# Patient Record
Sex: MALE | Race: BLACK OR AFRICAN AMERICAN | NOT HISPANIC OR LATINO
[De-identification: names, ages, dates, MRNs, and addresses within clinical notes are randomized per-mention and may not be internally consistent; named-entity substitution may affect disease eponyms.]

---

## 2023-12-08 PROBLEM — Z00.00 ENCOUNTER FOR PREVENTIVE HEALTH EXAMINATION: Status: ACTIVE | Noted: 2023-12-08

## 2023-12-11 ENCOUNTER — APPOINTMENT (OUTPATIENT)
Dept: ORTHOPEDIC SURGERY | Facility: CLINIC | Age: 48
End: 2023-12-11
Payer: COMMERCIAL

## 2023-12-11 VITALS — BODY MASS INDEX: 28.93 KG/M2 | WEIGHT: 180 LBS | HEIGHT: 66 IN

## 2023-12-11 DIAGNOSIS — Z78.9 OTHER SPECIFIED HEALTH STATUS: ICD-10-CM

## 2023-12-11 PROCEDURE — 73564 X-RAY EXAM KNEE 4 OR MORE: CPT | Mod: LT

## 2023-12-11 PROCEDURE — 99203 OFFICE O/P NEW LOW 30 MIN: CPT

## 2023-12-13 ENCOUNTER — APPOINTMENT (OUTPATIENT)
Dept: MRI IMAGING | Facility: CLINIC | Age: 48
End: 2023-12-13
Payer: COMMERCIAL

## 2023-12-13 PROCEDURE — 73721 MRI JNT OF LWR EXTRE W/O DYE: CPT | Mod: LT

## 2023-12-19 ENCOUNTER — APPOINTMENT (OUTPATIENT)
Dept: ORTHOPEDIC SURGERY | Facility: CLINIC | Age: 48
End: 2023-12-19

## 2023-12-26 ENCOUNTER — APPOINTMENT (OUTPATIENT)
Dept: ORTHOPEDIC SURGERY | Facility: CLINIC | Age: 48
End: 2023-12-26
Payer: COMMERCIAL

## 2023-12-26 PROCEDURE — 99213 OFFICE O/P EST LOW 20 MIN: CPT

## 2023-12-26 NOTE — HISTORY OF PRESENT ILLNESS
[4] : 4 [0] : 0 [Dull/Aching] : dull/aching [Sharp] : sharp [Shooting] : shooting [Stabbing] : stabbing [Occasional] : occasional [Ice] : ice [Standing] : standing [Walking] : walking [Stairs] : stairs [de-identified] : 12/26/2023: Patient is here today to discuss MRI results of his left knee. Reports symptoms unchanged since last visit. Knee keeps locking up. MRI showing complex medial meniscal tear, cartilage wear, subchondral edema.   12/11/23: New patient is a 48-year-old male presenting for evaluation of left knee pain that has been ongoing for 5 months after an injury while running.  States he was running and felt a sudden buckle of the left knee which was followed by swelling that lasted for several days and decreased range of motion.  Over the last few months, he has had improvement with the swelling and range of motion with home exercises and stretching.  He states the knee currently feels stronger.  However, he continues to have locking sensations when changing positions and pain with lateral movements upon exercise.  He has been icing intermittently.  He has been using a compression sleeve with exercise.  He has not needed to use any anti-inflammatories recently.  He denies any further swelling.  He denies any weakness or paresthesias [] : no [FreeTextEntry1] : left knee

## 2023-12-26 NOTE — ASSESSMENT
[FreeTextEntry1] : Ongoing left knee pain for 5 months after a buckling event while running.  Symptoms consistent with medial meniscal tear causing locking of the knee and pain with daily activities.  X-rays obtained showing chondrocalcinosis of the meniscus small degenerative changes.  MRI confirmed. Has completed several months now of HEP and conservative treatment.  - Referral to [facility] to discuss scope given mechanical symptoms. Discussed CSI as well, defers at this time - Recommended use of compression sleeve, ice, Aleve when symptoms are exacerbated - Continue home exercise program as tolerated.  Discussed use of exercise bike, elliptical, rower, swimming instead of running if this is causing him pain

## 2023-12-26 NOTE — PHYSICAL EXAM
[de-identified] : Constitutional: Well developed, well nourished, able to communicate Neuro: Normal sensation, No focal deficits Skin: Intact CV: Peripheral vascular exam grossly normal Pulm: No signs of respiratory distress Psych: Oriented, normal mood and affect

## 2023-12-26 NOTE — IMAGING
[Left] : left knee [There are no fractures, subluxations or dislocations. No significant abnormalities are seen] : There are no fractures, subluxations or dislocations. No significant abnormalities are seen [Degenerative change] : Degenerative change [Mild tricompartmental OA medial narrowing] : Mild tricompartmental OA medial narrowing [de-identified] : L knee: - No obvious deformity or swelling -Mild pain with palpation over posteromedial joint line. No pain lateral joint line. - ROM from 135 degrees of flexion to 0 degrees extension. - 5/5 strength with knee flexion and extension - Stable varus, valgus, Lachman's, posterior drawer testing - Negative Melisa's - Crepitus with patellar grind - Distally neurovascularly intact.    R knee: - No obvious deformity or swelling - No pain with palpation of medial or lateral joint line. - ROM from 135 degrees of flexion to 0 degrees extension. - 5/5 strength with knee flexion and extension - Stable varus, valgus, Lachman's, posterior drawer testing - Distally neurovascularly intact.    [FreeTextEntry9] : chondrocalcinosis of the meniscus

## 2024-01-17 ENCOUNTER — APPOINTMENT (OUTPATIENT)
Dept: ORTHOPEDIC SURGERY | Facility: CLINIC | Age: 49
End: 2024-01-17
Payer: COMMERCIAL

## 2024-01-17 DIAGNOSIS — S83.242A OTHER TEAR OF MEDIAL MENISCUS, CURRENT INJURY, LEFT KNEE, INITIAL ENCOUNTER: ICD-10-CM

## 2024-01-17 DIAGNOSIS — M23.92 UNSPECIFIED INTERNAL DERANGEMENT OF LEFT KNEE: ICD-10-CM

## 2024-01-17 PROCEDURE — 99214 OFFICE O/P EST MOD 30 MIN: CPT

## 2024-01-17 NOTE — DISCUSSION/SUMMARY
[de-identified] : Plan: Patient understands that He is a candidate for LEFT KNEE ARTHROSCOPY WITH PARTIAL MENISCECTOMY. Discussed procedure. Discussed non-operative and operative treatment options. All questions and concerns regarding the surgery were addressed. Went over the recovery timeline and expected outcomes following surgery. Patient elected to move forward with the surgical procedure.  Tests Ordered: Post-op and COVID  Prescription Medications Ordered: ~  ~  Braces/DME Ordered: ~  ~  Activity/Work/Sports Status: ~  ~  Additional Instructions: ~  ~  Follow-Up: 2 weeks post-op   The patient's current medication management of their orthopedic diagnosis was reviewed today: (1) We discussed a comprehensive treatment plan that included possible pharmaceutical management involving the use of prescription strength medications including but not limited to options such as oral Naprosyn 500mg BID, once daily Meloxicam 15 mg, or 500-650 mg Tylenol versus over the counter oral medications and topical prescription NSAID Pennsaid vs over the counter Voltaren gel.  (2) There is a moderate risk of morbidity with further treatment, especially from use of prescription strength medications and possible side effects of these medications which include upset stomach with oral medications, skin reactions to topical medications and cardiac/renal issues with long term use.  (3) I recommended that the patient follow-up with their medical physician to discuss any significant specific potential issues with long term medication use such as interactions with current medications or with exacerbation of underlying medical comorbidities.  (4) The benefits and risks associated with use of injectable, oral or topical, prescription and over the counter anti-inflammatory medications were discussed with the patient. The patient voiced understanding of the risks including but not limited to bleeding, stroke, kidney dysfunction, heart disease, and were referred to the black box warning label for further information.

## 2024-01-17 NOTE — IMAGING
[de-identified] : The patient is a well appearing 48 year old male of their stated age. Negative straight leg raise bilateral   LEFT Knee:                   ROM:  0-130 degrees   Lachman: Negative Pivot Shift: Negative Anterior Drawer: Negative Posterior Drawer / Sag: Negative Varus Stress 0 degrees: Stable Varus Stress 30 degrees: Stable Valgus Stress 0 degrees: Stable Valgus Stress 30 degrees: Stable Medial Melisa: Positive Lateral Melisa: Negative Patella Glide: 2+ Patella Apprehension: Negative Patella Grind: Negative   Palpation: Medial Joint Line: TTP Lateral Joint Line: Nontender Medial Collateral Ligament: Nontender Lateral Collateral Ligament/PLC: Nontender Distal Femur: Nontender Proximal Tibia: Nontender Tibial Tubercle: Nontender Distal Pole Patella: Nontender Quadriceps Tendon: Nontender &  Intact Patella Tendon: Nontender &  Intact Medial Distal Hamstring/PES: Nontender Lateral Distal Hamstring: Nontender & Stable Iliotibial Band: Nontender Medial Patellofemoral Ligament: Nontender Adductor: Nontender Proximal GSC-Plantaris: Nontender Calf: Supple & Nontender   Inspection: Deformity: No Erythema: No Ecchymosis: No Abrasions: No Effusion: Moderate Prepatellar Bursitis: No   Neurologic Exam: Sensation L4-S1: Grossly Intact   Motor Exam: Quadriceps: 5 out of 5 Hamstrings: 5 out of 5 EHL: 5 out of 5 FHL: 5 out of 5 TA: 5 out of 5 GS: 5 out of 5   Circulatory/Pulses: Dorsalis Pedis: 2+ Posterior Tibialis: 2+   Additional Pertinent Findings: None   Contralateral Knee:              ROM: 0-130 degrees   Other Pertinent Findings: None     X-RAYS of the LEFT knee (4 views, including AP, Lateral, Merchant, and Tunnel): no fracture or dislocation

## 2024-01-17 NOTE — HISTORY OF PRESENT ILLNESS
[] : yes [de-identified] : 01/17/2024: YAN PAPPAS is a 48 year old male complaining of left knee pain x 6 months after knee buckled while running. c/o locking and catching. pain at medial aspect of knee. saw [facility] 12/11/23 +xrays and MRI  [FreeTextEntry1] : left knee  [de-identified] : 12/26/23 [de-identified] : [facility] [de-identified] : xrays and MRI

## 2024-02-28 RX ORDER — ACETAMINOPHEN 500 MG/1
500 TABLET ORAL 3 TIMES DAILY
Qty: 60 | Refills: 0 | Status: ACTIVE | COMMUNITY
Start: 2024-02-28 | End: 1900-01-01

## 2024-02-28 RX ORDER — IBUPROFEN 600 MG/1
600 TABLET, FILM COATED ORAL 3 TIMES DAILY
Qty: 90 | Refills: 0 | Status: ACTIVE | COMMUNITY
Start: 2024-02-28 | End: 1900-01-01

## 2024-02-28 RX ORDER — OXYCODONE 5 MG/1
5 TABLET ORAL
Qty: 15 | Refills: 0 | Status: ACTIVE | COMMUNITY
Start: 2024-02-28 | End: 1900-01-01

## 2024-02-28 RX ORDER — ONDANSETRON 4 MG/1
4 TABLET ORAL EVERY 8 HOURS
Qty: 20 | Refills: 0 | Status: ACTIVE | COMMUNITY
Start: 2024-02-28 | End: 1900-01-01

## 2024-02-29 ENCOUNTER — APPOINTMENT (OUTPATIENT)
Age: 49
End: 2024-02-29
Payer: COMMERCIAL

## 2024-02-29 PROCEDURE — 29881 ARTHRS KNE SRG MNISECTMY M/L: CPT | Mod: LT

## 2024-02-29 PROCEDURE — 29881 ARTHRS KNE SRG MNISECTMY M/L: CPT | Mod: AS,LT

## 2024-02-29 PROCEDURE — 29875 ARTHRS KNEE SURG SYNVCT LMTD: CPT | Mod: AS,59,LT

## 2024-02-29 PROCEDURE — 29875 ARTHRS KNEE SURG SYNVCT LMTD: CPT | Mod: 59,LT

## 2024-03-13 ENCOUNTER — APPOINTMENT (OUTPATIENT)
Dept: ORTHOPEDIC SURGERY | Facility: CLINIC | Age: 49
End: 2024-03-13
Payer: COMMERCIAL

## 2024-03-13 DIAGNOSIS — Z98.890 OTHER SPECIFIED POSTPROCEDURAL STATES: ICD-10-CM

## 2024-03-13 PROCEDURE — 99024 POSTOP FOLLOW-UP VISIT: CPT

## 2024-03-13 NOTE — PHYSICAL EXAM
[de-identified] : No erythema, no discharge, no increased warmth to touch. Sutures intact. ROM consistent with immobility due to brace, strength testing deferred due to post-op status. Distally neurovascularly intact with common peroneal, saphenous, sural, tibial, superficial peroneal nerves intact. 2 DP pulse with capillary refill >2 seconds.

## 2024-03-13 NOTE — HISTORY OF PRESENT ILLNESS
[] : yes [de-identified] : 03/13/2024: YAN is here today for PO#1 s/p LT knee SCOPE on 2/29/24. Doing well. Denies f/c/s. Some soreness along IT band in left thigh.   01/17/2024: YAN PAPPAS is a 48 year old male complaining of left knee pain x 6 months after knee buckled while running. c/o locking and catching. pain at medial aspect of knee. saw [facility] 12/11/23 +xrays and MRI  [FreeTextEntry1] : left knee  [de-identified] : 12/26/23 [de-identified] : [facility] [de-identified] : xrays and MRI

## 2024-03-13 NOTE — DISCUSSION/SUMMARY
[de-identified] : Assessment & Plan: The patient is approximately 2 weeks postoperative. Sutures removed and Steri Strips applied today. The patient is instructed in wound management. The patient's post-op plan, protocol and activity modifications have been thoroughly discussed and the patient expressed understanding. The patient will control pain as discussed & continue ice and elevation as needed. The patient otherwise may advance activity as discussed.   Arthroscopy photos were reviewed in great detail.   Prescription Medications Ordered: [None]   Physical Therapy: [Continue per protocol, continue home exercise program]   Activity/Work/Sports Status: No restrictions   Follow-Up: [4 weeks or PRN]